# Patient Record
Sex: FEMALE | Race: ASIAN
[De-identification: names, ages, dates, MRNs, and addresses within clinical notes are randomized per-mention and may not be internally consistent; named-entity substitution may affect disease eponyms.]

---

## 2020-01-16 ENCOUNTER — APPOINTMENT (OUTPATIENT)
Dept: VASCULAR SURGERY | Facility: CLINIC | Age: 65
End: 2020-01-16
Payer: COMMERCIAL

## 2020-01-16 DIAGNOSIS — Z86.718 PERSONAL HISTORY OF OTHER VENOUS THROMBOSIS AND EMBOLISM: ICD-10-CM

## 2020-01-16 DIAGNOSIS — I80.01 PHLEBITIS AND THROMBOPHLEBITIS OF SUPERFICIAL VESSELS OF RIGHT LOWER EXTREMITY: ICD-10-CM

## 2020-01-16 DIAGNOSIS — Z86.711 PERSONAL HISTORY OF PULMONARY EMBOLISM: ICD-10-CM

## 2020-01-16 PROCEDURE — 93970 EXTREMITY STUDY: CPT

## 2020-01-16 PROCEDURE — 99204 OFFICE O/P NEW MOD 45 MIN: CPT

## 2020-01-16 NOTE — HISTORY OF PRESENT ILLNESS
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 She traveled from [place] to [place] on December 20th. She dnoticed some pain, redness and induration on December 25th, she went to urgent care who referred her to the ED. US was done and showed SVT and she was given NSAIDS and pain medication. She states the pain was the same for about one week and slowly got better, redness went away and induration softened. She flew back on January 2, 2020. pain continued to get better.

## 2020-01-16 NOTE — PHYSICAL EXAM
[Respiratory Effort] : normal respiratory effort [Normal Heart Sounds] : normal heart sounds [2+] : left 2+ [Ankle Swelling (On Exam)] : not present [Varicose Veins Of Lower Extremities] : not present [] : not present [Calm] : calm [de-identified] : well appearing

## 2020-01-16 NOTE — ASSESSMENT
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 Symptoms are almost completely gone on the right leg, no longer red/inflamed. No longer needs NSAIDS or warm compress. Discussed GSV reflux on the left leg. She is currently without significant symptoms, will continue to wear compression stockings and let us know in the future if the leg becomes symptomatic. FU PRN.

## 2020-01-16 NOTE — PROCEDURE
[FreeTextEntry1] : BLE venous US showing right GSV closed, SVT in the calf varicose veins. Left GSV reflux, no DVT/SVT